# Patient Record
Sex: MALE | URBAN - METROPOLITAN AREA
[De-identification: names, ages, dates, MRNs, and addresses within clinical notes are randomized per-mention and may not be internally consistent; named-entity substitution may affect disease eponyms.]

---

## 2022-10-21 ENCOUNTER — OFFICE VISIT (OUTPATIENT)
Dept: URGENT CARE | Facility: CLINIC | Age: 22
End: 2022-10-21
Payer: COMMERCIAL

## 2022-10-21 VITALS
SYSTOLIC BLOOD PRESSURE: 118 MMHG | OXYGEN SATURATION: 98 % | RESPIRATION RATE: 18 BRPM | DIASTOLIC BLOOD PRESSURE: 70 MMHG | TEMPERATURE: 99.2 F | HEART RATE: 96 BPM

## 2022-10-21 DIAGNOSIS — J01.90 ACUTE SINUSITIS, RECURRENCE NOT SPECIFIED, UNSPECIFIED LOCATION: Primary | ICD-10-CM

## 2022-10-21 PROCEDURE — 99203 OFFICE O/P NEW LOW 30 MIN: CPT | Performed by: PHYSICIAN ASSISTANT

## 2022-10-21 RX ORDER — AMOXICILLIN AND CLAVULANATE POTASSIUM 875; 125 MG/1; MG/1
1 TABLET, FILM COATED ORAL EVERY 12 HOURS SCHEDULED
Qty: 20 TABLET | Refills: 0 | Status: SHIPPED | OUTPATIENT
Start: 2022-10-21 | End: 2022-10-31

## 2022-10-21 NOTE — PATIENT INSTRUCTIONS
Please take antibiotic and finish it as instructed unless abnormal side effects  Nasal saline rinses every 1-2 hours while awake  Vaporizer by the bedside  May do Mucinex D to help decrease mucus production and keep mucus thin so you can clear easier  Follow-up with your primary care if not improving over the next 7-10 days

## 2022-10-21 NOTE — PROGRESS NOTES
[facility]    NAME: Ramses Her is a 23 y o  male  : 2000    MRN: [id number]  DATE: 2022  TIME: 7:05 PM    Assessment and Plan   Acute sinusitis, recurrence not specified, unspecified location [J01 90]  1  Acute sinusitis, recurrence not specified, unspecified location  amoxicillin-clavulanate (AUGMENTIN) 875-125 mg per tablet       Patient Instructions   Patient Instructions   Please take antibiotic and finish it as instructed unless abnormal side effects  Nasal saline rinses every 1-2 hours while awake  Vaporizer by the bedside  May do Mucinex D to help decrease mucus production and keep mucus thin so you can clear easier  Follow-up with your primary care if not improving over the next 7-10 days  Chief Complaint     Chief Complaint   Patient presents with   • Sinusitis     Pt C/O return of a sinus infection symptoms from a few weeks ago  Pt was prescribed abx and did not completed the prescription  Symptoms returned 3 days ago of coughing and sinus congestion with yellow mucus  Pt took 2 of the left over abx with symptoms improving but only has 1 tablet left from the prescription before  History of Present Illness   Ramses Her presents to the clinic c/o  77-year-old male comes in with thick postnasal drip and cough  Started in September and initially thought it was allergies because he was spending a lot of time going in and outside  He is not from around this area  He comes on the weekends to see his girlfriend  He was seen up in [place] and treated with Augmentin for sinusitis  He did not completely finished the medicine  He has been taking DayQuil and NyQuil  He restarted the Augmentin yesterday and says he thinks that is helping but he does not have much left  Drainage is thick and yellow and hard to clear  Worse at night  No history of prior sinus problems  Did have COVID 2022   No history of facial injuries or nasal septal deviation  Review of Systems   Review of Systems   Constitutional: Positive for fatigue  Negative for activity change, appetite change, chills, diaphoresis and fever  HENT: Positive for congestion and postnasal drip  Negative for rhinorrhea, sinus pressure, sinus pain and sore throat  Eyes: Negative  Respiratory: Positive for cough  Negative for apnea, choking, chest tightness, shortness of breath, wheezing and stridor  Cardiovascular: Negative  Current Medications     No long-term medications on file  Current Allergies     Allergies as of 10/21/2022   • (No Known Allergies)          The following portions of the patient's history were reviewed and updated as appropriate: allergies, current medications, past family history, past medical history, past social history, past surgical history and problem list   History reviewed  No pertinent past medical history  History reviewed  No pertinent surgical history  History reviewed  No pertinent family history  Objective   /70   Pulse 96   Temp 99 2 °F (37 3 °C)   Resp 18   SpO2 98%   No LMP for male patient  Physical Exam     Physical Exam  Vitals and nursing note reviewed  Constitutional:       General: He is not in acute distress  Appearance: Normal appearance  He is well-developed  He is not ill-appearing, toxic-appearing or diaphoretic  Comments: Accompanied by girlfriend   HENT:      Head: Normocephalic and atraumatic  Right Ear: Tympanic membrane, ear canal and external ear normal       Left Ear: Tympanic membrane, ear canal and external ear normal       Nose: Congestion and rhinorrhea present  Comments: No nasal septal deviation or polyps     Mouth/Throat:      Mouth: Mucous membranes are moist       Pharynx: No oropharyngeal exudate or posterior oropharyngeal erythema  Comments: Cobblestoning posterior pharynx  Eyes:      General: No scleral icterus  Right eye: No discharge  Left eye: No discharge  Conjunctiva/sclera: Conjunctivae normal       Pupils: Pupils are equal, round, and reactive to light  Neck:      Trachea: No tracheal deviation  Cardiovascular:      Rate and Rhythm: Normal rate and regular rhythm  Heart sounds: Normal heart sounds  No murmur heard  No friction rub  No gallop  Pulmonary:      Effort: Pulmonary effort is normal  No respiratory distress  Breath sounds: Normal breath sounds  No stridor  No wheezing, rhonchi or rales  Musculoskeletal:      Cervical back: Normal range of motion and neck supple  No rigidity or tenderness  Lymphadenopathy:      Cervical: No cervical adenopathy  Skin:     General: Skin is warm and dry  Findings: No rash  Comments: No acute rashes   Neurological:      Mental Status: He is alert and oriented to person, place, and time     Psychiatric:         Mood and Affect: Mood normal          Behavior: Behavior normal